# Patient Record
Sex: MALE | Race: WHITE | NOT HISPANIC OR LATINO | Employment: FULL TIME | URBAN - METROPOLITAN AREA
[De-identification: names, ages, dates, MRNs, and addresses within clinical notes are randomized per-mention and may not be internally consistent; named-entity substitution may affect disease eponyms.]

---

## 2024-02-02 ENCOUNTER — OFFICE VISIT (OUTPATIENT)
Dept: PRIMARY CARE | Facility: CLINIC | Age: 44
End: 2024-02-02
Payer: COMMERCIAL

## 2024-02-02 VITALS
SYSTOLIC BLOOD PRESSURE: 132 MMHG | WEIGHT: 220 LBS | HEIGHT: 72 IN | TEMPERATURE: 97.5 F | BODY MASS INDEX: 29.8 KG/M2 | DIASTOLIC BLOOD PRESSURE: 78 MMHG | HEART RATE: 85 BPM | OXYGEN SATURATION: 98 %

## 2024-02-02 DIAGNOSIS — Z80.0 FAMILY HISTORY OF COLON CANCER: ICD-10-CM

## 2024-02-02 DIAGNOSIS — Z12.11 COLON CANCER SCREENING: ICD-10-CM

## 2024-02-02 DIAGNOSIS — Z00.00 ROUTINE GENERAL MEDICAL EXAMINATION AT A HEALTH CARE FACILITY: Primary | ICD-10-CM

## 2024-02-02 PROBLEM — J30.2 SEASONAL ALLERGIES: Status: ACTIVE | Noted: 2021-09-20

## 2024-02-02 PROCEDURE — 99386 PREV VISIT NEW AGE 40-64: CPT | Performed by: PHYSICIAN ASSISTANT

## 2024-02-02 PROCEDURE — 1036F TOBACCO NON-USER: CPT | Performed by: PHYSICIAN ASSISTANT

## 2024-02-02 ASSESSMENT — ENCOUNTER SYMPTOMS
DIZZINESS: 0
ABDOMINAL PAIN: 0
SORE THROAT: 0
NUMBNESS: 0
NAUSEA: 0
DIARRHEA: 0
FATIGUE: 0
EYE PAIN: 0
ARTHRALGIAS: 0
CONSTIPATION: 0
HEADACHES: 0
NERVOUS/ANXIOUS: 0
DIFFICULTY URINATING: 0
PALPITATIONS: 0
DYSPHORIC MOOD: 0
VOMITING: 0
MYALGIAS: 0
SHORTNESS OF BREATH: 0

## 2024-02-02 NOTE — PROGRESS NOTES
Subjective   Patient ID: Zain Castillo is a 43 y.o. male who presents for New Patient Visit and Joint Swelling (Bilat lower ankle swelling x1 week. Pt states no longer swollen ).    HPI   Patient presents to establish.     Last week had some ankle soreness and a little swollen. Lasted a week. Better now the past several days.  Hasn't had problems with this previously. Unsure of any injuries or overuse. No redness or severe tenderness. No hx of gout.   No other joints were sore/swollen.     Hx of Hyperlipidemia;  on labs he brings with him from 7/28/23. HDL 70. Glucose 93 and A1c 5.4. Uric acid WNL. CBC and CMP WNL.      Not done as well with exercise lately. Eats a lot of eggs.       Last Colonoscopy about 10 years ago - negative. +Family hx of colon cancer.   No PCP in recent year.     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     Review of Systems   Constitutional:  Negative for fatigue.   HENT:  Negative for congestion, ear pain and sore throat.    Eyes:  Negative for pain and visual disturbance.   Respiratory:  Negative for shortness of breath.    Cardiovascular:  Negative for chest pain and palpitations.   Gastrointestinal:  Negative for abdominal pain, constipation, diarrhea, nausea and vomiting.   Genitourinary:  Negative for difficulty urinating and testicular pain.   Musculoskeletal:  Negative for arthralgias and myalgias.   Skin:  Negative for rash.   Neurological:  Negative for dizziness, numbness and headaches.   Psychiatric/Behavioral:  Negative for dysphoric mood. The patient is not nervous/anxious.          Past Medical History:   Diagnosis Date    Hyperlipidemia       Family History   Problem Relation Name Age of Onset    Breast cancer Mother Addie Castillo     Pulmonary embolism Mother Addie Castillo     Lung cancer Father Lalo Castillo     Colon cancer Father Lalo Castillo     Thyroid disease Daughter      Celiac disease Daughter      Juvenile idiopathic arthritis Son Julita Castillo      "[id number] positive Son Julita Castillo     Lung cancer Maternal Grandmother      Lymphoma Maternal Grandmother      Lymphoma Maternal Grandfather      Brain Aneurysm Paternal Grandfather        Social History     Tobacco Use    Smoking status: Never    Smokeless tobacco: Never   Substance Use Topics    Alcohol use: Never    Drug use: Never        Objective   /78   Pulse 85   Temp 36.4 °C (97.5 °F)   Ht 1.816 m (5' 11.5\")   Wt 99.8 kg (220 lb)   SpO2 98%   BMI 30.26 kg/m²     Physical Exam  Vitals and nursing note reviewed.   Constitutional:       Appearance: Normal appearance. He is well-developed.   HENT:      Head: Normocephalic.      Right Ear: Tympanic membrane, ear canal and external ear normal.      Left Ear: Tympanic membrane, ear canal and external ear normal.      Nose: Nose normal.      Mouth/Throat:      Mouth: Mucous membranes are moist.      Pharynx: Oropharynx is clear.   Eyes:      General: No scleral icterus.     Extraocular Movements: Extraocular movements intact.      Pupils: Pupils are equal, round, and reactive to light.   Cardiovascular:      Rate and Rhythm: Normal rate and regular rhythm.      Heart sounds: No murmur heard.  Pulmonary:      Effort: Pulmonary effort is normal.      Breath sounds: Normal breath sounds.   Abdominal:      Palpations: Abdomen is soft. There is no mass.      Tenderness: There is no abdominal tenderness.   Musculoskeletal:      Cervical back: Neck supple.      Right ankle: No swelling, deformity or ecchymosis. No tenderness. Normal range of motion. Normal pulse.      Right Achilles Tendon: Normal. No tenderness.      Left ankle: No swelling, deformity or ecchymosis. No tenderness. Normal range of motion. Normal pulse.      Left Achilles Tendon: Normal. No tenderness.   Lymphadenopathy:      Cervical: No cervical adenopathy.   Skin:     General: Skin is warm and dry.   Neurological:      General: No focal deficit present.      Mental Status: He is alert.     "  Gait: Gait normal.   Psychiatric:         Mood and Affect: Mood normal.         Behavior: Behavior normal.         Assessment/Plan   Diagnoses and all orders for this visit:  Routine general medical examination at a health care facility  Colon cancer screening  -     Referral to General Surgery; Future  Family history of colon cancer  -     Referral to General Surgery; Future       Discussed wellness issues.  Discussed diet and exercise and encouraged weight loss.  Referred to Dr. Urbano Dao for screening colonoscopy due to age and family history.  Reviewed labs that he brings with him for biometric screening that he gets done yearly through work.  Discussed diet and exercise and encouraged to work on reducing LDL.  Cut down on egg intake.  Monitor for recurrence of ankle soreness.   Follow up prn.

## 2024-06-17 ENCOUNTER — APPOINTMENT (OUTPATIENT)
Dept: GASTROENTEROLOGY | Facility: HOSPITAL | Age: 44
End: 2024-06-17
Payer: COMMERCIAL

## 2024-07-08 ENCOUNTER — ANESTHESIA (OUTPATIENT)
Dept: GASTROENTEROLOGY | Facility: HOSPITAL | Age: 44
End: 2024-07-08
Payer: COMMERCIAL

## 2024-07-08 ENCOUNTER — ANESTHESIA EVENT (OUTPATIENT)
Dept: GASTROENTEROLOGY | Facility: HOSPITAL | Age: 44
End: 2024-07-08
Payer: COMMERCIAL

## 2024-07-08 ENCOUNTER — HOSPITAL ENCOUNTER (OUTPATIENT)
Dept: GASTROENTEROLOGY | Facility: HOSPITAL | Age: 44
Discharge: HOME | End: 2024-07-08
Payer: COMMERCIAL

## 2024-07-08 VITALS
TEMPERATURE: 97.6 F | DIASTOLIC BLOOD PRESSURE: 87 MMHG | SYSTOLIC BLOOD PRESSURE: 130 MMHG | RESPIRATION RATE: 16 BRPM | OXYGEN SATURATION: 96 % | HEIGHT: 71 IN | WEIGHT: 225 LBS | BODY MASS INDEX: 31.5 KG/M2 | HEART RATE: 62 BPM

## 2024-07-08 DIAGNOSIS — Z12.11 SCREEN FOR COLON CANCER: Primary | ICD-10-CM

## 2024-07-08 DIAGNOSIS — Z80.0 FAMILY HISTORY OF COLON CANCER: ICD-10-CM

## 2024-07-08 PROCEDURE — 45380 COLONOSCOPY AND BIOPSY: CPT | Performed by: SURGERY

## 2024-07-08 PROCEDURE — 7100000010 HC PHASE TWO TIME - EACH INCREMENTAL 1 MINUTE

## 2024-07-08 PROCEDURE — 2500000005 HC RX 250 GENERAL PHARMACY W/O HCPCS: Performed by: NURSE ANESTHETIST, CERTIFIED REGISTERED

## 2024-07-08 PROCEDURE — 2500000004 HC RX 250 GENERAL PHARMACY W/ HCPCS (ALT 636 FOR OP/ED): Performed by: SURGERY

## 2024-07-08 PROCEDURE — 3700000001 HC GENERAL ANESTHESIA TIME - INITIAL BASE CHARGE

## 2024-07-08 PROCEDURE — 2500000004 HC RX 250 GENERAL PHARMACY W/ HCPCS (ALT 636 FOR OP/ED): Performed by: NURSE ANESTHETIST, CERTIFIED REGISTERED

## 2024-07-08 PROCEDURE — 3700000002 HC GENERAL ANESTHESIA TIME - EACH INCREMENTAL 1 MINUTE

## 2024-07-08 PROCEDURE — 7100000009 HC PHASE TWO TIME - INITIAL BASE CHARGE

## 2024-07-08 RX ORDER — SODIUM CHLORIDE 9 MG/ML
20 INJECTION, SOLUTION INTRAVENOUS CONTINUOUS
Status: DISCONTINUED | OUTPATIENT
Start: 2024-07-08 | End: 2024-07-09 | Stop reason: HOSPADM

## 2024-07-08 RX ORDER — LIDOCAINE HYDROCHLORIDE 20 MG/ML
INJECTION, SOLUTION INFILTRATION; PERINEURAL AS NEEDED
Status: DISCONTINUED | OUTPATIENT
Start: 2024-07-08 | End: 2024-07-08

## 2024-07-08 RX ORDER — PROPOFOL 10 MG/ML
INJECTION, EMULSION INTRAVENOUS AS NEEDED
Status: DISCONTINUED | OUTPATIENT
Start: 2024-07-08 | End: 2024-07-08

## 2024-07-08 SDOH — HEALTH STABILITY: MENTAL HEALTH: CURRENT SMOKER: 0

## 2024-07-08 ASSESSMENT — PAIN - FUNCTIONAL ASSESSMENT
PAIN_FUNCTIONAL_ASSESSMENT: 0-10

## 2024-07-08 ASSESSMENT — PAIN SCALES - GENERAL
PAINLEVEL_OUTOF10: 0 - NO PAIN
PAIN_LEVEL: 0

## 2024-07-08 ASSESSMENT — COLUMBIA-SUICIDE SEVERITY RATING SCALE - C-SSRS
2. HAVE YOU ACTUALLY HAD ANY THOUGHTS OF KILLING YOURSELF?: NO
6. HAVE YOU EVER DONE ANYTHING, STARTED TO DO ANYTHING, OR PREPARED TO DO ANYTHING TO END YOUR LIFE?: NO
1. IN THE PAST MONTH, HAVE YOU WISHED YOU WERE DEAD OR WISHED YOU COULD GO TO SLEEP AND NOT WAKE UP?: NO

## 2024-07-08 NOTE — H&P
"History Of Present Illness  Zain Castillo is a 43 y.o. male presenting with  for screen.     Past Medical History  He has a past medical history of Hyperlipidemia.    Surgical History  He has a past surgical history that includes Eye surgery (June 2020) and Anterior cruciate ligament repair (Right).     Social History  He reports that he has never smoked. He has never used smokeless tobacco. He reports that he does not drink alcohol and does not use drugs.    Family History  Family History   Problem Relation Name Age of Onset    Breast cancer Mother Addie Castillo     Pulmonary embolism Mother Addie Castillo     Lung cancer Father Lalo Castillo     Colon cancer Father Lalo Castillo     Thyroid disease Daughter      Celiac disease Daughter      Juvenile idiopathic arthritis Son Julita Castillo     HLA-B27 positive Son Julita Castillo     Lung cancer Maternal Grandmother      Lymphoma Maternal Grandmother      Lymphoma Maternal Grandfather      Brain Aneurysm Paternal Grandfather          Allergies  Patient has no known allergies.    Review of Systems     Physical Exam     Last Recorded Vitals  Blood pressure 127/80, pulse 70, temperature 36.6 °C (97.8 °F), temperature source Temporal, resp. rate 16, height 1.803 m (5' 11\"), weight 102 kg (225 lb), SpO2 93%.    Relevant Results               Assessment/Plan   Active Problems:  There are no active Hospital Problems.      For colo       I spent 15 minutes in the professional and overall care of this patient.      Urbano Dao MD  "

## 2024-07-08 NOTE — ANESTHESIA POSTPROCEDURE EVALUATION
Patient: Zain Castillo    Procedure Summary       Date: 07/08/24 Room / Location: [facility]    Anesthesia Start: 0956 Anesthesia Stop: 1040    Procedure: COLONOSCOPY Diagnosis:       Screen for colon cancer      Family history of colon cancer      Screen for colon cancer    Scheduled Providers: Urbano Dao MD Responsible Provider: ENEDINA Roque    Anesthesia Type: MAC ASA Status: 1            Anesthesia Type: MAC    Vitals Value Taken Time   /83 07/08/24 1038   Temp 36.2 °C (97.2 °F) 07/08/24 1038   Pulse 62 07/08/24 1038   Resp 21 07/08/24 1038   SpO2 94 % 07/08/24 1038       Anesthesia Post Evaluation    Patient location during evaluation: PACU  Patient participation: complete - patient participated  Level of consciousness: awake and alert  Pain score: 0  Pain management: adequate  Airway patency: patent  Cardiovascular status: acceptable and blood pressure returned to baseline  Respiratory status: acceptable, spontaneous ventilation and room air  Hydration status: acceptable  Postoperative Nausea and Vomiting: none        There were no known notable events for this encounter.

## 2024-07-08 NOTE — ANESTHESIA PREPROCEDURE EVALUATION
Patient: Zain Castillo    Procedure Information       Date/Time: 07/08/24 0930    Scheduled providers: Urbano Dao MD    Procedure: COLONOSCOPY    Location: [facility]            Relevant Problems   Anesthesia (within normal limits)      HEENT   (+) Seasonal allergies       Clinical information reviewed:   Tobacco  Allergies  Meds   Med Hx  Surg Hx   Fam Hx  Soc Hx        NPO Detail:  NPO/Void Status  Date of Last Liquid: 07/07/24  Time of Last Liquid: 2000  Date of Last Solid: 07/07/24  Time of Last Solid: 0800  Last Intake Type: Clear fluids  Time of Last Void: 0700         Physical Exam    Airway  Mallampati: II  TM distance: >3 FB  Neck ROM: full     Cardiovascular - normal exam     Dental - normal exam     Pulmonary - normal exam     Abdominal            Anesthesia Plan    History of general anesthesia?: yes  History of complications of general anesthesia?: no    ASA 1     MAC     The patient is not a current smoker.    intravenous induction   Anesthetic plan and risks discussed with patient.    Plan discussed with CRNA.

## 2024-07-17 LAB
LABORATORY COMMENT REPORT: NORMAL
PATH REPORT.FINAL DX SPEC: NORMAL
PATH REPORT.GROSS SPEC: NORMAL
PATH REPORT.RELEVANT HX SPEC: NORMAL
PATH REPORT.TOTAL CANCER: NORMAL

## 2025-04-18 ENCOUNTER — OFFICE VISIT (OUTPATIENT)
Dept: PRIMARY CARE | Facility: CLINIC | Age: 45
End: 2025-04-18
Payer: COMMERCIAL

## 2025-04-18 VITALS
TEMPERATURE: 97.6 F | DIASTOLIC BLOOD PRESSURE: 78 MMHG | SYSTOLIC BLOOD PRESSURE: 126 MMHG | OXYGEN SATURATION: 97 % | HEART RATE: 68 BPM

## 2025-04-18 DIAGNOSIS — J01.90 ACUTE NON-RECURRENT SINUSITIS, UNSPECIFIED LOCATION: Primary | ICD-10-CM

## 2025-04-18 DIAGNOSIS — R05.1 ACUTE COUGH: ICD-10-CM

## 2025-04-18 PROCEDURE — 99214 OFFICE O/P EST MOD 30 MIN: CPT | Performed by: PHYSICIAN ASSISTANT

## 2025-04-18 RX ORDER — AMOXICILLIN 500 MG/1
1000 CAPSULE ORAL 2 TIMES DAILY
Qty: 40 CAPSULE | Refills: 0 | Status: SHIPPED | OUTPATIENT
Start: 2025-04-18 | End: 2025-04-28

## 2025-04-18 ASSESSMENT — ENCOUNTER SYMPTOMS
SHORTNESS OF BREATH: 0
ABDOMINAL PAIN: 0
PALPITATIONS: 0

## 2025-04-18 NOTE — PROGRESS NOTES
"Subjective   Patient ID: Zain Castillo is a 44 y.o. male who presents for Cough and Throat Problem (\"Feels like something stuck in throat, clearing throat constantly\" x 2 weeks).    HPI   Patient c/o cough and throat irritation over the past 2 weeks.  Getting PND.   No sinus pain. Denies fever, chills, aches, shortness of breath and sore throat.  Present for 2 weeks. Has worsened since onset.   Cough: Cough is productive of greenish sputum.     Sinus pain: Experiencing frontal pain.   Denies associated diarrhea, earache and vomiting.     Doesn't correlate with eating.     Patient denies recent known COVID exposure or international travel.        reports that he has never smoked. He has never used smokeless tobacco.    Review of Systems   Respiratory:  Negative for shortness of breath.    Cardiovascular:  Negative for chest pain and palpitations.   Gastrointestinal:  Negative for abdominal pain.       Objective   /78   Pulse 68   Temp 36.4 °C (97.6 °F)   SpO2 97%     Physical Exam  Vitals and nursing note reviewed.   Constitutional:       Appearance: Normal appearance.   HENT:      Head: Normocephalic.      Right Ear: Tympanic membrane and ear canal normal.      Left Ear: Tympanic membrane and ear canal normal.      Nose: Congestion present.      Right Sinus: Frontal sinus tenderness present.      Left Sinus: Frontal sinus tenderness present.      Mouth/Throat:      Mouth: Mucous membranes are moist.      Pharynx: No oropharyngeal exudate or posterior oropharyngeal erythema.   Eyes:      General: No scleral icterus.  Cardiovascular:      Rate and Rhythm: Normal rate and regular rhythm.   Pulmonary:      Effort: Pulmonary effort is normal.      Breath sounds: Normal breath sounds.   Lymphadenopathy:      Cervical: No cervical adenopathy.   Skin:     General: Skin is warm and dry.   Neurological:      Mental Status: He is alert.         Assessment/Plan   Diagnoses and all orders for this visit:  Acute " non-recurrent sinusitis, unspecified location  -     amoxicillin (Amoxil) 500 mg capsule; Take 2 capsules (1,000 mg) by mouth 2 times a day for 10 days.  Acute cough  -     amoxicillin (Amoxil) 500 mg capsule; Take 2 capsules (1,000 mg) by mouth 2 times a day for 10 days.